# Patient Record
Sex: MALE | Race: WHITE | NOT HISPANIC OR LATINO | Employment: FULL TIME | URBAN - METROPOLITAN AREA
[De-identification: names, ages, dates, MRNs, and addresses within clinical notes are randomized per-mention and may not be internally consistent; named-entity substitution may affect disease eponyms.]

---

## 2018-06-05 ENCOUNTER — HOSPITAL ENCOUNTER (EMERGENCY)
Facility: HOSPITAL | Age: 43
Discharge: HOME | End: 2018-06-05
Attending: EMERGENCY MEDICINE | Admitting: EMERGENCY MEDICINE

## 2018-06-05 ENCOUNTER — APPOINTMENT (OUTPATIENT)
Dept: GENERAL RADIOLOGY | Facility: HOSPITAL | Age: 43
End: 2018-06-05

## 2018-06-05 ENCOUNTER — APPOINTMENT (OUTPATIENT)
Dept: CT IMAGING | Facility: HOSPITAL | Age: 43
End: 2018-06-05

## 2018-06-05 VITALS
WEIGHT: 220 LBS | HEART RATE: 90 BPM | TEMPERATURE: 98.8 F | SYSTOLIC BLOOD PRESSURE: 114 MMHG | HEIGHT: 71 IN | OXYGEN SATURATION: 95 % | DIASTOLIC BLOOD PRESSURE: 68 MMHG | RESPIRATION RATE: 18 BRPM | BODY MASS INDEX: 30.8 KG/M2

## 2018-06-05 DIAGNOSIS — T14.8XXA MUSCLE STRAIN: ICD-10-CM

## 2018-06-05 DIAGNOSIS — R10.84 GENERALIZED ABDOMINAL PAIN: ICD-10-CM

## 2018-06-05 DIAGNOSIS — M25.512 ACUTE PAIN OF LEFT SHOULDER: ICD-10-CM

## 2018-06-05 DIAGNOSIS — N28.9 RENAL INSUFFICIENCY: Primary | ICD-10-CM

## 2018-06-05 LAB
ALBUMIN SERPL-MCNC: 4.38 G/DL (ref 3.2–4.8)
ALBUMIN/GLOB SERPL: 1.2 G/DL (ref 1.5–2.5)
ALP SERPL-CCNC: 84 U/L (ref 25–100)
ALT SERPL W P-5'-P-CCNC: 28 U/L (ref 7–40)
ANION GAP SERPL CALCULATED.3IONS-SCNC: 8 MMOL/L (ref 3–11)
AST SERPL-CCNC: 31 U/L (ref 0–33)
BASOPHILS # BLD AUTO: 0.02 10*3/MM3 (ref 0–0.2)
BASOPHILS NFR BLD AUTO: 0.4 % (ref 0–1)
BILIRUB SERPL-MCNC: 1 MG/DL (ref 0.3–1.2)
BILIRUB UR QL STRIP: NEGATIVE
BUN BLD-MCNC: 18 MG/DL (ref 9–23)
BUN/CREAT SERPL: 10.7 (ref 7–25)
CALCIUM SPEC-SCNC: 9.3 MG/DL (ref 8.7–10.4)
CHLORIDE SERPL-SCNC: 103 MMOL/L (ref 99–109)
CLARITY UR: CLEAR
CO2 SERPL-SCNC: 25 MMOL/L (ref 20–31)
COLOR UR: YELLOW
CREAT BLD-MCNC: 1.69 MG/DL (ref 0.6–1.3)
DEPRECATED RDW RBC AUTO: 43.1 FL (ref 37–54)
EOSINOPHIL # BLD AUTO: 0.13 10*3/MM3 (ref 0–0.3)
EOSINOPHIL NFR BLD AUTO: 2.3 % (ref 0–3)
ERYTHROCYTE [DISTWIDTH] IN BLOOD BY AUTOMATED COUNT: 12.7 % (ref 11.3–14.5)
GFR SERPL CREATININE-BSD FRML MDRD: 45 ML/MIN/1.73
GLOBULIN UR ELPH-MCNC: 3.6 GM/DL
GLUCOSE BLD-MCNC: 90 MG/DL (ref 70–100)
GLUCOSE UR STRIP-MCNC: NEGATIVE MG/DL
HCT VFR BLD AUTO: 43.2 % (ref 38.9–50.9)
HGB BLD-MCNC: 14.7 G/DL (ref 13.1–17.5)
HGB UR QL STRIP.AUTO: NEGATIVE
HOLD SPECIMEN: NORMAL
HOLD SPECIMEN: NORMAL
IMM GRANULOCYTES # BLD: 0.01 10*3/MM3 (ref 0–0.03)
IMM GRANULOCYTES NFR BLD: 0.2 % (ref 0–0.6)
KETONES UR QL STRIP: ABNORMAL
LEUKOCYTE ESTERASE UR QL STRIP.AUTO: NEGATIVE
LIPASE SERPL-CCNC: 45 U/L (ref 6–51)
LYMPHOCYTES # BLD AUTO: 0.79 10*3/MM3 (ref 0.6–4.8)
LYMPHOCYTES NFR BLD AUTO: 14.2 % (ref 24–44)
MCH RBC QN AUTO: 31.5 PG (ref 27–31)
MCHC RBC AUTO-ENTMCNC: 34 G/DL (ref 32–36)
MCV RBC AUTO: 92.7 FL (ref 80–99)
MONOCYTES # BLD AUTO: 0.45 10*3/MM3 (ref 0–1)
MONOCYTES NFR BLD AUTO: 8.1 % (ref 0–12)
NEUTROPHILS # BLD AUTO: 4.16 10*3/MM3 (ref 1.5–8.3)
NEUTROPHILS NFR BLD AUTO: 75 % (ref 41–71)
NITRITE UR QL STRIP: NEGATIVE
PH UR STRIP.AUTO: 6 [PH] (ref 5–8)
PLATELET # BLD AUTO: 166 10*3/MM3 (ref 150–450)
PMV BLD AUTO: 10 FL (ref 6–12)
POTASSIUM BLD-SCNC: 4 MMOL/L (ref 3.5–5.5)
PROT SERPL-MCNC: 8 G/DL (ref 5.7–8.2)
PROT UR QL STRIP: NEGATIVE
RBC # BLD AUTO: 4.66 10*6/MM3 (ref 4.2–5.76)
SODIUM BLD-SCNC: 136 MMOL/L (ref 132–146)
SP GR UR STRIP: 1.02 (ref 1–1.03)
UROBILINOGEN UR QL STRIP: ABNORMAL
WBC NRBC COR # BLD: 5.55 10*3/MM3 (ref 3.5–10.8)
WHOLE BLOOD HOLD SPECIMEN: NORMAL
WHOLE BLOOD HOLD SPECIMEN: NORMAL

## 2018-06-05 PROCEDURE — 80053 COMPREHEN METABOLIC PANEL: CPT

## 2018-06-05 PROCEDURE — 73030 X-RAY EXAM OF SHOULDER: CPT

## 2018-06-05 PROCEDURE — 99283 EMERGENCY DEPT VISIT LOW MDM: CPT

## 2018-06-05 PROCEDURE — 74176 CT ABD & PELVIS W/O CONTRAST: CPT

## 2018-06-05 PROCEDURE — 81003 URINALYSIS AUTO W/O SCOPE: CPT | Performed by: PHYSICIAN ASSISTANT

## 2018-06-05 PROCEDURE — 85025 COMPLETE CBC W/AUTO DIFF WBC: CPT

## 2018-06-05 PROCEDURE — 83690 ASSAY OF LIPASE: CPT

## 2018-06-05 RX ORDER — CYCLOBENZAPRINE HCL 10 MG
10 TABLET ORAL 3 TIMES DAILY PRN
Qty: 15 TABLET | Refills: 0 | OUTPATIENT
Start: 2018-06-05 | End: 2021-03-09

## 2018-06-05 RX ORDER — SODIUM CHLORIDE 0.9 % (FLUSH) 0.9 %
10 SYRINGE (ML) INJECTION AS NEEDED
Status: DISCONTINUED | OUTPATIENT
Start: 2018-06-05 | End: 2018-06-05

## 2018-06-05 RX ORDER — SODIUM CHLORIDE 0.9 % (FLUSH) 0.9 %
10 SYRINGE (ML) INJECTION AS NEEDED
Status: DISCONTINUED | OUTPATIENT
Start: 2018-06-05 | End: 2018-06-05 | Stop reason: HOSPADM

## 2018-06-05 RX ADMIN — SODIUM CHLORIDE 1000 ML: 9 INJECTION, SOLUTION INTRAVENOUS at 14:05

## 2018-06-05 RX ADMIN — SODIUM CHLORIDE 1000 ML: 9 INJECTION, SOLUTION INTRAVENOUS at 14:04

## 2018-06-05 NOTE — ED PROVIDER NOTES
Subjective   Pt is a 41 yo male presenting to ED with abdominal pain. 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 Pt denies fever, chills, N/V/D or urinary sx. Pt states the pain is worse with movement. He denies that the pain radiates. He is also complaining of left shoulder pain for the past month. Worse the past few days. He can't recall a specific shoulder injury. Pain worse after lifting and using it. He denies numbness, tingling or weakness to UE. Pt with hx of HTN and Gout.         History provided by:  Patient  Abdominal Pain   Pain location:  Generalized  Pain radiates to:  Does not radiate  Onset quality:  Sudden  Duration:  1 day  Timing:  Constant  Chronicity:  New  Context: not diet changes, not eating, not recent illness, not sick contacts, not suspicious food intake and not trauma    Worsened by:  Movement  Ineffective treatments:  None tried  Associated symptoms: no chest pain, no chills, no constipation, no cough, no diarrhea, no dysuria, no fatigue, no fever, no hematochezia, no hematuria, no melena, no nausea, no shortness of breath, no sore throat and no vomiting    Risk factors: no alcohol abuse, has not had multiple surgeries and no NSAID use        Review of Systems   Constitutional: Negative for chills, fatigue and fever.   HENT: Negative for congestion, ear pain, sore throat and trouble swallowing.    Eyes: Negative for pain, redness and visual disturbance.   Respiratory: Negative for cough and shortness of breath.    Cardiovascular: Negative for chest pain and leg swelling.   Gastrointestinal: Positive for abdominal pain. Negative for blood in stool, constipation, diarrhea, hematochezia, melena, nausea and vomiting.   Genitourinary: Negative for difficulty urinating, dysuria, flank pain and hematuria.   Musculoskeletal: Positive for arthralgias (Left shoulder ). Negative for back pain and joint swelling.   Skin: Negative.  Negative for  rash and wound.   Allergic/Immunologic: Negative.    Neurological: Negative for dizziness, syncope, weakness, numbness and headaches.   Psychiatric/Behavioral: Negative for confusion.   All other systems reviewed and are negative.      Past Medical History:   Diagnosis Date   • Gout    • Hypertension        No Known Allergies    History reviewed. No pertinent surgical history.    History reviewed. No pertinent family history.    Social History     Social History   • Marital status: Single     Social History Main Topics   • Smoking status: Former Smoker     Types: Cigarettes   • Smokeless tobacco: Never Used   • Alcohol use No   • Drug use: No   • Sexual activity: Yes     Partners: Female     Other Topics Concern   • Not on file           Objective   Physical Exam   Constitutional: He is oriented to person, place, and time. He appears well-developed and well-nourished.   HENT:   Head: Atraumatic.   Nose: Nose normal.   Eyes: Conjunctivae, EOM and lids are normal. Pupils are equal, round, and reactive to light.   Neck: Normal range of motion. Neck supple.   Cardiovascular: Normal rate, regular rhythm and normal heart sounds.    Pulmonary/Chest: Effort normal and breath sounds normal.   Abdominal: Soft. Bowel sounds are normal. He exhibits no distension. There is generalized tenderness. There is no rebound, no guarding and no CVA tenderness.   Musculoskeletal: Normal range of motion. He exhibits no edema or deformity.        Left shoulder: He exhibits tenderness (anterior). He exhibits normal range of motion, no swelling, no deformity, normal pulse and normal strength.   Neurological: He is alert and oriented to person, place, and time. He has normal strength. No sensory deficit.   Skin: Skin is warm, dry and intact.   Psychiatric: He has a normal mood and affect. His behavior is normal.   Nursing note and vitals reviewed.      Procedures           ED Course      Re-examined patient several times in ED. Pt resting  comfortably and declined any pain meds in ED. Discussed results including labs, CT and xray. No acute findings on imaging. Discussed elevated Cr level. He states he has known kidney disease and is followed by Dr. Asher. He states he believes his Cr usually runs around 1.6 to 1.8. Discussed that some of his meds could be a factor in his renal function and that he needs to f/u with PCP and Dr. Asher. He will return to ED if new or worse sx. Due to renal function patient will not take NSAIDs and only Tylenol and Flexeril PRN.     Reviewed old records. No prior renal function.     Recent Results (from the past 24 hour(s))   Comprehensive Metabolic Panel    Collection Time: 06/05/18 11:58 AM   Result Value Ref Range    Glucose 90 70 - 100 mg/dL    BUN 18 9 - 23 mg/dL    Creatinine 1.69 (H) 0.60 - 1.30 mg/dL    Sodium 136 132 - 146 mmol/L    Potassium 4.0 3.5 - 5.5 mmol/L    Chloride 103 99 - 109 mmol/L    CO2 25.0 20.0 - 31.0 mmol/L    Calcium 9.3 8.7 - 10.4 mg/dL    Total Protein 8.0 5.7 - 8.2 g/dL    Albumin 4.38 3.20 - 4.80 g/dL    ALT (SGPT) 28 7 - 40 U/L    AST (SGOT) 31 0 - 33 U/L    Alkaline Phosphatase 84 25 - 100 U/L    Total Bilirubin 1.0 0.3 - 1.2 mg/dL    eGFR Non African Amer 45 (L) >60 mL/min/1.73    Globulin 3.6 gm/dL    A/G Ratio 1.2 (L) 1.5 - 2.5 g/dL    BUN/Creatinine Ratio 10.7 7.0 - 25.0    Anion Gap 8.0 3.0 - 11.0 mmol/L   Lipase    Collection Time: 06/05/18 11:58 AM   Result Value Ref Range    Lipase 45 6 - 51 U/L   Light Blue Top    Collection Time: 06/05/18 11:58 AM   Result Value Ref Range    Extra Tube hold for add-on    Green Top (Gel)    Collection Time: 06/05/18 11:58 AM   Result Value Ref Range    Extra Tube Hold for add-ons.    Lavender Top    Collection Time: 06/05/18 11:58 AM   Result Value Ref Range    Extra Tube hold for add-on    Gold Top - SST    Collection Time: 06/05/18 11:58 AM   Result Value Ref Range    Extra Tube Hold for add-ons.    CBC Auto Differential    Collection  Time: 06/05/18 11:58 AM   Result Value Ref Range    WBC 5.55 3.50 - 10.80 10*3/mm3    RBC 4.66 4.20 - 5.76 10*6/mm3    Hemoglobin 14.7 13.1 - 17.5 g/dL    Hematocrit 43.2 38.9 - 50.9 %    MCV 92.7 80.0 - 99.0 fL    MCH 31.5 (H) 27.0 - 31.0 pg    MCHC 34.0 32.0 - 36.0 g/dL    RDW 12.7 11.3 - 14.5 %    RDW-SD 43.1 37.0 - 54.0 fl    MPV 10.0 6.0 - 12.0 fL    Platelets 166 150 - 450 10*3/mm3    Neutrophil % 75.0 (H) 41.0 - 71.0 %    Lymphocyte % 14.2 (L) 24.0 - 44.0 %    Monocyte % 8.1 0.0 - 12.0 %    Eosinophil % 2.3 0.0 - 3.0 %    Basophil % 0.4 0.0 - 1.0 %    Immature Grans % 0.2 0.0 - 0.6 %    Neutrophils, Absolute 4.16 1.50 - 8.30 10*3/mm3    Lymphocytes, Absolute 0.79 0.60 - 4.80 10*3/mm3    Monocytes, Absolute 0.45 0.00 - 1.00 10*3/mm3    Eosinophils, Absolute 0.13 0.00 - 0.30 10*3/mm3    Basophils, Absolute 0.02 0.00 - 0.20 10*3/mm3    Immature Grans, Absolute 0.01 0.00 - 0.03 10*3/mm3   Urinalysis With / Microscopic If Indicated (No Culture) - Urine, Clean Catch    Collection Time: 06/05/18  3:40 PM   Result Value Ref Range    Color, UA Yellow Yellow, Straw    Appearance, UA Clear Clear    pH, UA 6.0 5.0 - 8.0    Specific Gravity, UA 1.017 1.001 - 1.030    Glucose, UA Negative Negative    Ketones, UA Trace (A) Negative    Bilirubin, UA Negative Negative    Blood, UA Negative Negative    Protein, UA Negative Negative    Leuk Esterase, UA Negative Negative    Nitrite, UA Negative Negative    Urobilinogen, UA 1.0 E.U./dL 0.2 - 1.0 E.U./dL     Note: In addition to lab results from this visit, the labs listed above may include labs taken at another facility or during a different encounter within the last 24 hours. Please correlate lab times with ED admission and discharge times for further clarification of the services performed during this visit.    XR Shoulder 2+ View Left   Preliminary Result   No acute osseous abnormality.        D:  06/05/2018   E:  06/05/2018          CT Abdomen Pelvis Without Contrast     "(Results Pending)     Vitals:    06/05/18 1146 06/05/18 1555 06/05/18 1556   BP: 93/54 114/68    BP Location: Left arm     Patient Position: Sitting     Pulse: 105  90   Resp: 18     Temp: 98.8 °F (37.1 °C)     TempSrc: Oral     SpO2: 94% 95% 95%   Weight: 99.8 kg (220 lb)     Height: 180.3 cm (71\")       Medications   sodium chloride 0.9 % flush 10 mL (not administered)   sodium chloride 0.9 % bolus 1,000 mL (1,000 mL Intravenous New Bag 6/5/18 1405)   sodium chloride 0.9 % bolus 1,000 mL (1,000 mL Intravenous New Bag 6/5/18 1404)     ECG/EMG Results (last 24 hours)     ** No results found for the last 24 hours. **                    [facility]      Final diagnoses:   Renal insufficiency   Generalized abdominal pain   Muscle strain   Acute pain of left shoulder            LISA Morales  06/05/18 4008    "

## 2018-06-05 NOTE — DISCHARGE INSTRUCTIONS
Follow up with one of the [facility] physician groups below to setup primary care. If you have trouble following up, please call Mildred Hernadez, our transitional care nurse, at [phone].    (Dr. Gunter, Dr. Barger, Dr. Crawford, and Dr. Sraah.)  [facility], Primary Care, [phone], [address], [place] [address]    [facility], Primary Care, [phone], [address], [address] [place], [address] [facility]) [facility], Primary Care, [phone], [address], [address] [place], [address] [facility], Primary Care, [phone], [address], [address] [place], [address]     [place] [address] [facility], Primary Care, [phone], [address], [address] [place], [address]    [place] 2 [facility], Primary Care, [phone], [address]    [place], [address] [facility], Primary Care, [phone], [address], [address] [place], [address] [facility], Primary Care, [phone], [address], [address] [place], [address] [facility]) [facility], Primary Care, [phone], [address], [address] [place], [address] [facility], Primary Care, [phone], [address], [address] [place], [address] [facility], Primary Care, [phone], [address], [place], [address]     [facility], Primary Care, [phone], [address] [id number]

## 2021-03-09 ENCOUNTER — HOSPITAL ENCOUNTER (EMERGENCY)
Facility: HOSPITAL | Age: 46
Discharge: HOME | End: 2021-03-09
Attending: EMERGENCY MEDICINE | Admitting: EMERGENCY MEDICINE

## 2021-03-09 ENCOUNTER — APPOINTMENT (OUTPATIENT)
Dept: CT IMAGING | Facility: HOSPITAL | Age: 46
End: 2021-03-09

## 2021-03-09 VITALS
DIASTOLIC BLOOD PRESSURE: 70 MMHG | TEMPERATURE: 98.4 F | HEART RATE: 91 BPM | HEIGHT: 71 IN | RESPIRATION RATE: 18 BRPM | OXYGEN SATURATION: 97 % | BODY MASS INDEX: 31.5 KG/M2 | WEIGHT: 225 LBS | SYSTOLIC BLOOD PRESSURE: 114 MMHG

## 2021-03-09 DIAGNOSIS — J03.90 TONSILLITIS: Primary | ICD-10-CM

## 2021-03-09 DIAGNOSIS — J02.9 SORE THROAT: ICD-10-CM

## 2021-03-09 LAB
ALBUMIN SERPL-MCNC: 4.5 G/DL (ref 3.5–5.2)
ALBUMIN/GLOB SERPL: 1.1 G/DL
ALP SERPL-CCNC: 76 U/L (ref 39–117)
ALT SERPL W P-5'-P-CCNC: 13 U/L (ref 1–41)
ANION GAP SERPL CALCULATED.3IONS-SCNC: 9 MMOL/L (ref 5–15)
AST SERPL-CCNC: 21 U/L (ref 1–40)
BASOPHILS # BLD AUTO: 0.04 10*3/MM3 (ref 0–0.2)
BASOPHILS NFR BLD AUTO: 0.3 % (ref 0–1.5)
BILIRUB SERPL-MCNC: 1.3 MG/DL (ref 0–1.2)
BUN SERPL-MCNC: 23 MG/DL (ref 6–20)
BUN/CREAT SERPL: 15.3 (ref 7–25)
CALCIUM SPEC-SCNC: 10.1 MG/DL (ref 8.6–10.5)
CHLORIDE SERPL-SCNC: 98 MMOL/L (ref 98–107)
CO2 SERPL-SCNC: 27 MMOL/L (ref 22–29)
CREAT BLDA-MCNC: 1.7 MG/DL (ref 0.6–1.3)
CREAT SERPL-MCNC: 1.5 MG/DL (ref 0.76–1.27)
DEPRECATED RDW RBC AUTO: 43.3 FL (ref 37–54)
EOSINOPHIL # BLD AUTO: 0.1 10*3/MM3 (ref 0–0.4)
EOSINOPHIL NFR BLD AUTO: 0.8 % (ref 0.3–6.2)
ERYTHROCYTE [DISTWIDTH] IN BLOOD BY AUTOMATED COUNT: 12.4 % (ref 12.3–15.4)
FLUAV RNA RESP QL NAA+PROBE: NOT DETECTED
FLUBV RNA RESP QL NAA+PROBE: NOT DETECTED
GFR SERPL CREATININE-BSD FRML MDRD: 51 ML/MIN/1.73
GLOBULIN UR ELPH-MCNC: 4 GM/DL
GLUCOSE SERPL-MCNC: 88 MG/DL (ref 65–99)
HCT VFR BLD AUTO: 44.8 % (ref 37.5–51)
HETEROPH AB SER QL LA: NEGATIVE
HGB BLD-MCNC: 15 G/DL (ref 13–17.7)
IMM GRANULOCYTES # BLD AUTO: 0.07 10*3/MM3 (ref 0–0.05)
IMM GRANULOCYTES NFR BLD AUTO: 0.5 % (ref 0–0.5)
LYMPHOCYTES # BLD AUTO: 1.52 10*3/MM3 (ref 0.7–3.1)
LYMPHOCYTES NFR BLD AUTO: 11.9 % (ref 19.6–45.3)
MCH RBC QN AUTO: 31.7 PG (ref 26.6–33)
MCHC RBC AUTO-ENTMCNC: 33.5 G/DL (ref 31.5–35.7)
MCV RBC AUTO: 94.7 FL (ref 79–97)
MONOCYTES # BLD AUTO: 1.05 10*3/MM3 (ref 0.1–0.9)
MONOCYTES NFR BLD AUTO: 8.2 % (ref 5–12)
NEUTROPHILS NFR BLD AUTO: 78.3 % (ref 42.7–76)
NEUTROPHILS NFR BLD AUTO: 9.96 10*3/MM3 (ref 1.7–7)
NRBC BLD AUTO-RTO: 0 /100 WBC (ref 0–0.2)
PLATELET # BLD AUTO: 195 10*3/MM3 (ref 140–450)
PMV BLD AUTO: 9.4 FL (ref 6–12)
POTASSIUM SERPL-SCNC: 4.3 MMOL/L (ref 3.5–5.2)
PROT SERPL-MCNC: 8.5 G/DL (ref 6–8.5)
RBC # BLD AUTO: 4.73 10*6/MM3 (ref 4.14–5.8)
S PYO AG THROAT QL: NEGATIVE
SARS-COV-2 RNA RESP QL NAA+PROBE: NOT DETECTED
SODIUM SERPL-SCNC: 134 MMOL/L (ref 136–145)
WBC # BLD AUTO: 12.74 10*3/MM3 (ref 3.4–10.8)

## 2021-03-09 PROCEDURE — 25010000002 KETOROLAC TROMETHAMINE PER 15 MG: Performed by: NURSE PRACTITIONER

## 2021-03-09 PROCEDURE — 25010000002 IOPAMIDOL 61 % SOLUTION: Performed by: EMERGENCY MEDICINE

## 2021-03-09 PROCEDURE — 85025 COMPLETE CBC W/AUTO DIFF WBC: CPT | Performed by: NURSE PRACTITIONER

## 2021-03-09 PROCEDURE — 25010000002 DEXAMETHASONE SODIUM PHOSPHATE 100 MG/10ML SOLUTION: Performed by: NURSE PRACTITIONER

## 2021-03-09 PROCEDURE — 70491 CT SOFT TISSUE NECK W/DYE: CPT

## 2021-03-09 PROCEDURE — 80053 COMPREHEN METABOLIC PANEL: CPT | Performed by: NURSE PRACTITIONER

## 2021-03-09 PROCEDURE — 99284 EMERGENCY DEPT VISIT MOD MDM: CPT

## 2021-03-09 PROCEDURE — 87081 CULTURE SCREEN ONLY: CPT | Performed by: NURSE PRACTITIONER

## 2021-03-09 PROCEDURE — 96375 TX/PRO/DX INJ NEW DRUG ADDON: CPT

## 2021-03-09 PROCEDURE — 99283 EMERGENCY DEPT VISIT LOW MDM: CPT

## 2021-03-09 PROCEDURE — 96365 THER/PROPH/DIAG IV INF INIT: CPT

## 2021-03-09 PROCEDURE — 87880 STREP A ASSAY W/OPTIC: CPT | Performed by: NURSE PRACTITIONER

## 2021-03-09 PROCEDURE — 86308 HETEROPHILE ANTIBODY SCREEN: CPT | Performed by: NURSE PRACTITIONER

## 2021-03-09 PROCEDURE — 87636 SARSCOV2 & INF A&B AMP PRB: CPT | Performed by: NURSE PRACTITIONER

## 2021-03-09 PROCEDURE — 82565 ASSAY OF CREATININE: CPT

## 2021-03-09 RX ORDER — CLINDAMYCIN HYDROCHLORIDE 300 MG/1
300 CAPSULE ORAL 4 TIMES DAILY
Qty: 40 CAPSULE | Refills: 0 | Status: SHIPPED | OUTPATIENT
Start: 2021-03-09

## 2021-03-09 RX ORDER — CLINDAMYCIN PHOSPHATE 600 MG/50ML
600 INJECTION INTRAVENOUS ONCE
Status: COMPLETED | OUTPATIENT
Start: 2021-03-09 | End: 2021-03-09

## 2021-03-09 RX ORDER — PREDNISONE 20 MG/1
60 TABLET ORAL DAILY
Qty: 12 TABLET | Refills: 0 | Status: SHIPPED | OUTPATIENT
Start: 2021-03-09

## 2021-03-09 RX ORDER — KETOROLAC TROMETHAMINE 15 MG/ML
15 INJECTION, SOLUTION INTRAMUSCULAR; INTRAVENOUS EVERY 6 HOURS PRN
Status: DISCONTINUED | OUTPATIENT
Start: 2021-03-09 | End: 2021-03-09 | Stop reason: HOSPADM

## 2021-03-09 RX ADMIN — SODIUM CHLORIDE 1000 ML: 9 INJECTION, SOLUTION INTRAVENOUS at 13:09

## 2021-03-09 RX ADMIN — IOPAMIDOL 100 ML: 612 INJECTION, SOLUTION INTRAVENOUS at 13:48

## 2021-03-09 RX ADMIN — CLINDAMYCIN IN 5 PERCENT DEXTROSE 600 MG: 12 INJECTION, SOLUTION INTRAVENOUS at 13:59

## 2021-03-09 RX ADMIN — KETOROLAC TROMETHAMINE 15 MG: 15 INJECTION, SOLUTION INTRAMUSCULAR; INTRAVENOUS at 13:11

## 2021-03-09 RX ADMIN — DEXAMETHASONE SODIUM PHOSPHATE 10 MG: 10 INJECTION, SOLUTION INTRAMUSCULAR; INTRAVENOUS at 13:16

## 2021-03-09 NOTE — ED PROVIDER NOTES
Subjective   Patient sent from [facility] for further evaluation of possible peritonsillar abscess versus tonsillitis.  Patient tells me 3 days ago he started having left-sided throat pain.  He is having pain with swallowing including his own saliva.  Subjective fever he denies any chest pain or difficulty breathing.  No history of similar.      Sore Throat  Location:  Left  Quality:  Aching  Onset quality:  Gradual  Duration:  3 days  Timing:  Constant  Progression:  Worsening  Chronicity:  New  Relieved by:  Nothing  Worsened by:  Drinking and eating  Associated symptoms: adenopathy, fever and trouble swallowing    Associated symptoms: no abdominal pain, no chest pain, no chills, no cough and no shortness of breath        Review of Systems   Constitutional: Positive for fever. Negative for chills and diaphoresis.   HENT: Positive for sore throat and trouble swallowing. Negative for congestion.    Respiratory: Negative for cough, choking, chest tightness, shortness of breath and wheezing.    Cardiovascular: Negative for chest pain and leg swelling.   Gastrointestinal: Negative for abdominal distention, abdominal pain, anal bleeding, blood in stool, constipation, diarrhea, nausea and vomiting.   Genitourinary: Negative for difficulty urinating, dysuria, flank pain, frequency, hematuria and urgency.   Hematological: Positive for adenopathy.   All other systems reviewed and are negative.      Past Medical History:   Diagnosis Date   • Gout    • Hypertension        No Known Allergies    Past Surgical History:   Procedure Laterality Date   • NO PAST SURGERIES         History reviewed. No pertinent family history.    Social History     Socioeconomic History   • Marital status: Single     Spouse name: Not on file   • Number of children: Not on file   • Years of education: Not on file   • Highest education level: Not on file   Tobacco Use   • Smoking status: Former Smoker     Types: Cigarettes   • Smokeless tobacco: Never Used    Substance and Sexual Activity   • Alcohol use: No   • Drug use: No   • Sexual activity: Yes     Partners: Female           Objective   Physical Exam  Constitutional:       Appearance: He is well-developed.   HENT:      Head: Normocephalic and atraumatic.      Right Ear: External ear normal.      Left Ear: External ear normal.      Nose: Nose normal.      Mouth/Throat:      Comments: I cannot appreciate any overt abscess to the left side.  He does have bilateral enlarged tonsils with exudate noted.  He is more tender on the left side.  His exam is limited related to pain.  He does have anterior cervical lymphadenopathy.  Eyes:      Conjunctiva/sclera: Conjunctivae normal.      Pupils: Pupils are equal, round, and reactive to light.   Cardiovascular:      Rate and Rhythm: Normal rate and regular rhythm.      Heart sounds: Normal heart sounds.   Pulmonary:      Effort: Pulmonary effort is normal.      Breath sounds: Normal breath sounds.   Abdominal:      General: Bowel sounds are normal.      Palpations: Abdomen is soft.   Musculoskeletal:         General: Normal range of motion.      Cervical back: Normal range of motion and neck supple.   Skin:     General: Skin is warm and dry.   Neurological:      Mental Status: He is alert and oriented to person, place, and time.   Psychiatric:         Behavior: Behavior normal.         Judgment: Judgment normal.         Procedures           ED Course  ED Course as of Mar 09 1523   Tue Mar 09, 2021   1252 I cannot appreciate any overt peritonsillar abscess given my exam however his throat exam is fairly painful.  Again is reasonable to go ahead and start on antibiotics get some more advanced imaging for further evaluation we will also need to check for strep as well as Covid.    [JM]   1195 I spoke to the patient he tells me he is doing some better but still having pain in his left side of his throat.  I have paged ENT Dr. Knapp.  Actually I am currently on hold with his  office.    [JM]   9902 I spoke to Dr. Knapp and I reviewed the CAT scan with him.  He can follow-up with him in the office tomorrow.  He recommends a Medrol Dosepak as well as clindamycin.  Patient overall tells me he is feeling better.  I did repeat his exam I do not see any evidence of airway compromise.    [JM]      ED Course User Index  [JM] Gentry Garay APRN           CT Soft Tissue Neck With Contrast   Final Result   Diffuse edema of the left-sided oropharynx extending down to   the supraglottic larynx compatible with tonsillitis centered on the left   lingual tonsil. There is a small, 1.5 cm area of somewhat heterogeneous   fluid attenuation, favored to represent phlegmonous change with a   component of small abscess not entirely excluded. There is minimal   rightward deviation of the adjacent airway, without associated   significant luminal narrowing. Recommend correlation with direct   laryngoscopy and exam. There is associated reactive left-sided cervical   adenopathy.           This report was finalized on 3/9/2021 2:26 PM by Raymnudo Asher.                                            [facility]    Final diagnoses:   Tonsillitis   Sore throat            Gentry Garay APRN  03/09/21 1523

## 2021-03-11 LAB — BACTERIA SPEC AEROBE CULT: NORMAL
